# Patient Record
Sex: FEMALE | Race: WHITE
[De-identification: names, ages, dates, MRNs, and addresses within clinical notes are randomized per-mention and may not be internally consistent; named-entity substitution may affect disease eponyms.]

---

## 2019-12-01 ENCOUNTER — HOSPITAL ENCOUNTER (EMERGENCY)
Dept: HOSPITAL 59 - ER | Age: 54
Discharge: HOME | End: 2019-12-01
Payer: COMMERCIAL

## 2019-12-01 DIAGNOSIS — R42: ICD-10-CM

## 2019-12-01 DIAGNOSIS — I10: Primary | ICD-10-CM

## 2019-12-01 LAB
ABSOLUTE NEUTROPHIL COUNT: 3.52
ANION GAP SERPL CALC-SCNC: 14 MMOL/L (ref 7–16)
BASOPHILS NFR BLD: 0.7 % (ref 0–6)
BUN SERPL-MCNC: 16 MG/DL (ref 6–20)
CO2 SERPL-SCNC: 27 MMOL/L (ref 22–29)
CREAT SERPL-MCNC: 0.6 MG/DL (ref 0.5–0.9)
EOSINOPHIL NFR BLD: 2.1 % (ref 0–6)
ERYTHROCYTE [DISTWIDTH] IN BLOOD BY AUTOMATED COUNT: 13 % (ref 11.5–14.5)
EST GLOMERULAR FILTRATION RATE: > 60 ML/MIN
GLUCOSE SERPL-MCNC: 88 MG/DL (ref 74–109)
GRANULOCYTES NFR BLD: 61.6 % (ref 47–80)
HCT VFR BLD CALC: 45.4 % (ref 35–47)
HGB BLD-MCNC: 14.9 GM/DL (ref 11.6–16)
LYMPHOCYTES NFR BLD AUTO: 28.8 % (ref 16–45)
MCH RBC QN AUTO: 33.1 PG (ref 27–33)
MCHC RBC AUTO-ENTMCNC: 32.8 G/DL (ref 32–36)
MCV RBC AUTO: 100.9 FL (ref 81–97)
MONOCYTES NFR BLD: 6.8 % (ref 0–9)
PLATELET # BLD: 259 K/UL (ref 130–400)
PMV BLD AUTO: 9 FL (ref 7.4–10.4)
RBC # BLD AUTO: 4.5 M/UL (ref 3.8–5.4)
WBC # BLD AUTO: 5.7 K/UL (ref 4.2–12.2)

## 2019-12-01 PROCEDURE — 99284 EMERGENCY DEPT VISIT MOD MDM: CPT

## 2019-12-01 PROCEDURE — 80048 BASIC METABOLIC PNL TOTAL CA: CPT

## 2019-12-01 PROCEDURE — 85025 COMPLETE CBC W/AUTO DIFF WBC: CPT

## 2019-12-01 NOTE — EMERGENCY DEPARTMENT RECORD
History of Present Illness





- General


Chief Complaint: Hypertension


Stated Complaint: HIGH BP


Time Seen by Provider: 19 11:32


Source: Patient, RN notes reviewed


Mode of Arrival: Ambulatory





- History of Present Illness


Initial Comments: 


patient took some decongestant this am at 6:30 am 20 mg not sure of the name 

[employer] brand.  patient went into [facility] urgent [facility] and given flonase for her 

sinuses  /133 and was high at the urgent care and patient said it was 

running high the last couple of days.  Patient drinks 4 glasses of wine per day 

on the weekends and i advised her to reduce to one glass per day maybe causing 

the BP to go up.





Onset/Timin


-: Days(s)


Timing: Gradual onset


Description: Lightheadedness


History of Same: Yes (has hx.)


History of Trauma: No





- Rina Coma Scale


Eye Response: (4) Open spontaneously


Motor Response: (6) Obeys commands


Verbal Response: (5) Oriented


Cumming Total: 15





- Symptoms of Stroke


Baseline State: Baseline State





- Related Data


                                  Previous Rx's











 Medication  Instructions  Recorded


 


Epinephrine [Epipen 2-Spike] 0.3 mg IJ ASDIR #2 ml 14


 


Clonidine HCl 0.1 mg PO TID #45 tablet 19











                                    Allergies











Allergy/AdvReac Type Severity Reaction Status Date / Time


 


bee venom protein (honey bee) Allergy Severe ANAPHYLAXIS Verified 19 11:17














Travel Screening





- Travel/Exposure Within Last 30 Days


Have you traveled within the last 30 days?: No





- Travel Symptoms


Symptom Screening: None





Review of Systems


Reviewed: No additional complaints except as noted below


Constitutional: Reports: As per HPI.  Denies: Chills, Fever, Malaise, Night 

sweats, Weakness, Weight change


Eyes: Reports: As per HPI.  Denies: Eye discharge, Eye pain, Photophobia, Vision

change


ENT: Reports: As per HPI.  Denies: Congestion, Dental pain, Ear pain, Epistaxis,

Hearing loss, Throat pain


Respiratory: Reports: As per HPI.  Denies: Cough, Dyspnea, Hemoptysis, Stridor, 

Wheezes


Cardiovascular: Reports: As per HPI.  Denies: Arrhythmia, Chest pain, Dyspnea on

exertion, Edema, Murmurs, Orthopnea, Palpitations, Paroxysmal nocturnal dyspnea,

Rheumatic Fever, Syncope


Endocrine: Reports: As per HPI.  Denies: Fatigue, Heat or cold intolerance, 

Polydipsia, Polyuria


Gastrointestinal: Reports: As per HPI.  Denies: Abdominal pain, Constipation, 

Diarrhea, Hematemesis, Hematochezia, Melena, Nausea, Vomiting


Genitourinary: Reports: As per HPI.  Denies: Abnormal menses, Discharge, 

Dyspareunia, Dysuria, Frequency, Hematuria, Incontinence, Retention, Urgency


Musculoskeletal: Reports: As per HPI.  Denies: Arthralgia, Back pain, Gout, 

Joint swelling, Myalgia, Neck pain


Skin: Reports: As per HPI.  Denies: Bruising, Change in color, Change in 

hair/nails, Lesions, Pruritus, Rash


Neurological: Reports: As per HPI.  Denies: Abnormal gait, Confusion, Headache, 

Numbness, Paresthesias, Seizure, Tingling, Tremors, Vertigo, Weakness


Psychiatric: Reports: As per HPI.  Denies: Anxiety, Auditory hallucinations, 

Depression, Homicidal thoughts, Suicidal thoughts, Visual hallucinations


Hematological/Lymphatic: Reports: As per HPI.  Denies: Anemia, Blood Clots, Easy

bleeding, Easy bruising, Swollen glands





Past Medical History





- SOCIAL HISTORY


Smoking Status: Never smoker


Alcohol Use: Occasional





- RESPIRATORY


Hx Respiratory Disorders: Yes


Hx Sleep Apnea: Yes (mild)


Hx of CPAP: Yes





- CARDIOVASCULAR


Hx Cardio Disorders: Yes


Hx Hypertension: Yes





- NEURO


Hx Neuro Disorders: No





- GI


Hx GI Disorders: No





- 


Hx Genitourinary Disorders: No





- ENDOCRINE


Hx Endocrine Disorders: No





- MUSCULOSKELETAL


Hx Musculoskeletal Disorders: Yes


Hx Arthritis: Yes





- PSYCH


Hx Psych Problems: Yes


Hx Anxiety: Yes


Hx Depression: Yes





- HEMATOLOGY/ONCOLOGY


Hx Hematology/Oncology Disorders: No





Family Medical History


Any Significant Family History?: Yes


Hx Alcohol Use: Grandparents


Hx Anxiety: Mother


Hx Heart Disease: Brother/Sister


Hx HTN: Mother, Grandparents





Physical Exam





- General


General Appearance: Alert, Oriented x3, Cooperative, No acute distress





- Head


Head exam: Normal inspection





- Eye


Eye exam: Normal appearance, PERRL


Pupils: Normal accommodation





- ENT


ENT exam: Normal exam, Mucous membranes moist, Normal external ear exam, Normal 

orophraynx, TM's normal bilaterally


Ear exam: Normal external inspection.  negative: External canal tenderness


Nasal Exam: Normal inspection.  negative: Discharge, Sinus tenderness


Mouth exam: Normal external inspection, Tongue normal


Teeth exam: Normal inspection.  negative: Dental caries


Throat exam: Normal inspection.  negative: Tonsillar erythema, Tonsillar exudate





- Neck


Neck exam: Normal inspection, Full ROM.  negative: Tenderness





- Respiratory


Respiratory exam: Normal lung sounds bilaterally.  negative: Respiratory 

distress





- Cardiovascular


Cardiovascular Exam: Regular rate, Normal rhythm, Normal heart sounds





- GI/Abdominal


GI/Abdominal exam: Soft, Normal bowel sounds.  negative: Tenderness





- Rectal


Rectal exam: Deferred





- 


 exam: Deferred





- Extremities


Extremities exam: Normal inspection, Full ROM, Normal capillary refill.  

negative: Tenderness





- Back


Back exam: Reports: Normal inspection, Full ROM.  Denies: Muscle spasm, Rash 

noted, Tenderness





- Neurological


Neurological exam: Alert, Normal gait, Oriented X3, Reflexes normal





- Psychiatric


Psychiatric exam: Normal affect, Normal mood





- Skin


Skin exam: Dry, Intact, Normal color, Warm





Course





                                   Vital Signs











  19





  11:07


 


Temperature 98.5 F


 


Pulse Rate 76


 


Respiratory 20





Rate 


 


Blood Pressure 229/133


 


Pulse Ox 97














Medical Decision Making





- Lab Data


Result diagrams: 


                                 19 12:05





                                 19 12:05





Disposition


Clinical Impression: 


Hypertension


Qualifiers:


 Hypertension type: essential hypertension Qualified Code(s): I10 - Essential 

(primary) hypertension





Disposition: Home, Self-Care


Condition: (1) Good


Instructions:  Hypertension (ED)


Additional Instructions: 


follow up with family DR in three to four days


decrease wine to one glass a day





Prescriptions: 


Clonidine HCl 0.1 mg PO TID #45 tablet


Forms:  Patient Portal Access


Time of Disposition: 11:34





Quality





- Quality Measures


Quality Measures: N/A





- Blood Pressure Screening


Does Patient Have Any of the Following: No, Active Dx of HTN


Blood Pressure Classification: Hypertensive Reading


Systolic Measurement: 229


Diastolic Measurement: 133


Screening for High Blood Pressure: Patient Exclusion, Hx of HTN []